# Patient Record
Sex: FEMALE | Race: WHITE | URBAN - METROPOLITAN AREA
[De-identification: names, ages, dates, MRNs, and addresses within clinical notes are randomized per-mention and may not be internally consistent; named-entity substitution may affect disease eponyms.]

---

## 2022-11-21 ENCOUNTER — LAB REQUISITION (OUTPATIENT)
Dept: LAB | Facility: CLINIC | Age: 37
End: 2022-11-21

## 2022-11-21 DIAGNOSIS — Z01.419 ENCOUNTER FOR GYNECOLOGICAL EXAMINATION (GENERAL) (ROUTINE) WITHOUT ABNORMAL FINDINGS: ICD-10-CM

## 2022-11-21 PROCEDURE — G0145 SCR C/V CYTO,THINLAYER,RESCR: HCPCS | Performed by: PHYSICIAN ASSISTANT

## 2022-11-21 PROCEDURE — 87624 HPV HI-RISK TYP POOLED RSLT: CPT | Performed by: PHYSICIAN ASSISTANT

## 2022-11-25 LAB
BKR LAB AP GYN ADEQUACY: NORMAL
BKR LAB AP GYN INTERPRETATION: NORMAL
BKR LAB AP HPV REFLEX: NORMAL
BKR LAB AP LMP: NORMAL
BKR LAB AP PREVIOUS ABNL DX: NORMAL
BKR LAB AP PREVIOUS ABNORMAL: NORMAL
PATH REPORT.COMMENTS IMP SPEC: NORMAL
PATH REPORT.COMMENTS IMP SPEC: NORMAL
PATH REPORT.RELEVANT HX SPEC: NORMAL

## 2022-11-28 LAB
HUMAN PAPILLOMA VIRUS 16 DNA: NEGATIVE
HUMAN PAPILLOMA VIRUS 18 DNA: NEGATIVE
HUMAN PAPILLOMA VIRUS FINAL DIAGNOSIS: ABNORMAL
HUMAN PAPILLOMA VIRUS OTHER HR: POSITIVE

## 2023-02-20 ENCOUNTER — LAB REQUISITION (OUTPATIENT)
Dept: LAB | Facility: CLINIC | Age: 38
End: 2023-02-20

## 2023-02-20 DIAGNOSIS — Z83.49 FAMILY HISTORY OF OTHER ENDOCRINE, NUTRITIONAL AND METABOLIC DISEASES: ICD-10-CM

## 2023-02-20 LAB — TSH SERPL DL<=0.005 MIU/L-ACNC: 0.81 UIU/ML (ref 0.3–4.2)

## 2023-02-20 PROCEDURE — 84443 ASSAY THYROID STIM HORMONE: CPT | Performed by: PHYSICIAN ASSISTANT

## 2023-10-16 ENCOUNTER — LAB REQUISITION (OUTPATIENT)
Dept: LAB | Facility: CLINIC | Age: 38
End: 2023-10-16

## 2023-10-16 DIAGNOSIS — Z01.419 ENCOUNTER FOR GYNECOLOGICAL EXAMINATION (GENERAL) (ROUTINE) WITHOUT ABNORMAL FINDINGS: ICD-10-CM

## 2023-10-16 PROCEDURE — 87624 HPV HI-RISK TYP POOLED RSLT: CPT | Performed by: PHYSICIAN ASSISTANT

## 2023-10-16 PROCEDURE — G0145 SCR C/V CYTO,THINLAYER,RESCR: HCPCS | Performed by: PHYSICIAN ASSISTANT

## 2023-10-20 LAB
HUMAN PAPILLOMA VIRUS 16 DNA: NEGATIVE
HUMAN PAPILLOMA VIRUS 18 DNA: NEGATIVE
HUMAN PAPILLOMA VIRUS FINAL DIAGNOSIS: NORMAL
HUMAN PAPILLOMA VIRUS OTHER HR: NEGATIVE

## 2024-12-02 ENCOUNTER — LAB REQUISITION (OUTPATIENT)
Dept: LAB | Facility: CLINIC | Age: 39
End: 2024-12-02

## 2024-12-02 DIAGNOSIS — Z12.4 ENCOUNTER FOR SCREENING FOR MALIGNANT NEOPLASM OF CERVIX: ICD-10-CM

## 2024-12-03 LAB
HPV HR 12 DNA CVX QL NAA+PROBE: NEGATIVE
HPV16 DNA CVX QL NAA+PROBE: NEGATIVE
HPV18 DNA CVX QL NAA+PROBE: NEGATIVE
HUMAN PAPILLOMA VIRUS FINAL DIAGNOSIS: NORMAL

## 2024-12-05 LAB
BKR AP ASSOCIATED HPV REPORT: NORMAL
BKR LAB AP GYN ADEQUACY: NORMAL
BKR LAB AP GYN INTERPRETATION: NORMAL
BKR LAB AP LMP: NORMAL
BKR LAB AP PREVIOUS ABNL DX: NORMAL
BKR LAB AP PREVIOUS ABNORMAL: NORMAL
PATH REPORT.COMMENTS IMP SPEC: NORMAL
PATH REPORT.COMMENTS IMP SPEC: NORMAL
PATH REPORT.RELEVANT HX SPEC: NORMAL

## 2025-02-24 NOTE — PROGRESS NOTES
Chief Complaint: Dyslipidemia    HPI (2/26/25): Mylene Vera is a 39 year old female with a past medical history of dyslipidemia who was referred to me for evaluation of dyslipidemia.     Briefly, Ms. Vera was recently seen by her OB/GYN.  The OB/GYN team ordered labs, including a lipid panel.  Dyslipidemia showed that she had total cholesterol 500, triglycerides of 238, LDL of 399, and HDL of 54.  Hence, she presents to cardiology for further evaluation.  Notably, she did have a lipid panel done in 2015.  This showed a total cholesterol 4 9, LDL of 276, HDL 47, triglycerides of 367. She had her thyroid function checked in October 2024 and it was normal.     Today, Ms. Vera notes that she was first told about her dyslipidemia in her adulthood. In the last few months, she has made a number of dietary changes to adopt a cardiovascularly healthy diet. Her cousin is a Dietitian. Her diet is rich in fruit and vegetables and she avoids red meat. She is a Teacher. Ms. Vera walks her dog twice a day for exercise and more on the weekends. No regular alcohol, tobacco, vaping, or illicits. She has been on atorvastatin 80 mg daily since December 2024.  Ms. Vera is asymptomatic.    A comprehensive ROS was done and the details are included above in the HPI.    Past Medical History:  - No prior history of hypertension, T2DM, dyslipidemia, CAD, TIA/stroke, vascular aneurysms, PAD  History reviewed. No pertinent past medical history.    Past Surgical History:    History reviewed. No pertinent surgical history.    Drug History:  Home cardiac meds: Atorvastatin 80 mg daily.  Current Outpatient Medications   Medication Sig Dispense Refill    atorvastatin (LIPITOR) 80 MG tablet Take 1 tablet (80 mg) by mouth at bedtime.*      norethindrone (MICRONOR) 0.35 MG tablet            Family History:  Father with prior MI before 55 (also with dyslipidemia)   Youngest brother with dyslipidemia and DM (another brother and sister  "without dyslipidemia)  Patient does not have kids  Cousins on paternal side also with hypercholesterolemia  - No family history of sudden cardiac death, valvular disorders  Family History   Problem Relation Age of Onset    Diabetes Mother     Heart Disease Father     Diabetes Father        Social History:    Social History     Tobacco Use    Smoking status: Never    Smokeless tobacco: Never   Substance Use Topics    Alcohol use: Yes     Comment: occ.       Allergies   Allergen Reactions    Sulfa Antibiotics Other (See Comments) and Unknown     PN: patient request   Mother is allergic to sulfa         Physical Examination:  Vitals: /84 (BP Location: Right arm, Patient Position: Chair, Cuff Size: Adult Large)   Pulse 86   Ht 1.575 m (5' 2\")   Wt 83.5 kg (184 lb)   SpO2 99%   BMI 33.65 kg/m    BMI= Body mass index is 33.65 kg/m .    GENERAL: Healthy, alert and no distress  RESPIRATORY: No signs of resp distress. Lungs CTAB.  CARDIOVASCULAR:  No JVD, regular, normal S1+S2 without added sounds or murmurs.  EXTREMITIES: Warm, well-perfused, no edema.   NEUROLOGY: GCS 15/15, no focal deficits.  VASC: No carotid bruits. Carotid, radial, brachial, popliteal, dorsalis pedis and posterior tibialis pulses are normal in volumes and symmetric bilaterally.   PSYCH: Cooperative, pleasant affect.       Investigations:  I personally viewed and interpreted the following investigations:    Labs:    CBC RESULTS:  No results found for: \"WBC\", \"RBC\", \"HGB\", \"HCT\", \"MCV\", \"MCH\", \"MCHC\", \"RDW\", \"PLT\"    CMP RESULTS:  No results found for: \"NA\", \"POTASSIUM\", \"CHLORIDE\", \"CO2\", \"ANIONGAP\", \"GLC\", \"BUN\", \"CR\", \"GFRESTIMATED\", \"GFRESTBLACK\", \"CHANTELL\", \"BILITOTAL\", \"ALBUMIN\", \"ALKPHOS\", \"ALT\", \"AST\"     INR RESULTS:  No results found for: \"INR\"    BNP RESULTS:  No results found for: \"NTBNPI\"  No results found for: \"BNP\"    LIPIDS:  No results found for: \"CHOL\"  No results found for: \"HDL\"  No results found for: \"LDL\"  No results found " "for: \"TRIG\"          Assessment and Plan:   Mylene Vera is a 39 year old female with a past medical history of dyslipidemia presented to me for evaluation of the same in ferry 2025.    Ms. Vera and I had a lengthy conversation greater than nature of dyslipidemia.  I have a suspicion that she has a genetic cause of her dyslipidemia given the significantly elevated LDL she exhibits in the setting of a good lifestyle.  In view of this, I recommended that she continue her atorvastatin 80 mg daily.  I also suggested that she seek genetic counseling and possible genetic testing.  Ms. Vera was also interested in further risk stratification given her family history of premature coronary disease.  In view of this, I have referred her for a CT CAC.    Problems:  Dyslipidemia  Family history of premature coronary disease    Plan:  -Continue atorvastatin 80 mg daily  -CT CAC  -Genetic counseling/testing      A total of 45 minutes were spent on the day of the visit for chart review, care coordination, face-to-face consultation with the patient, and documentation.       Vini Patel [facility] [place]    Cardiovascular Division    CC  Patient Care Team:  Catrachita Abbott MD as PCP - General (Family Medicine)                                                                     "

## 2025-02-26 ENCOUNTER — OFFICE VISIT (OUTPATIENT)
Dept: CARDIOLOGY | Facility: CLINIC | Age: 40
End: 2025-02-26
Payer: COMMERCIAL

## 2025-02-26 VITALS
HEART RATE: 86 BPM | HEIGHT: 62 IN | OXYGEN SATURATION: 99 % | DIASTOLIC BLOOD PRESSURE: 84 MMHG | SYSTOLIC BLOOD PRESSURE: 126 MMHG | WEIGHT: 184 LBS | BODY MASS INDEX: 33.86 KG/M2

## 2025-02-26 DIAGNOSIS — E78.5 DYSLIPIDEMIA: Primary | ICD-10-CM

## 2025-02-26 RX ORDER — ACETAMINOPHEN AND CODEINE PHOSPHATE 120; 12 MG/5ML; MG/5ML
SOLUTION ORAL
COMMUNITY

## 2025-02-26 RX ORDER — ATORVASTATIN CALCIUM 80 MG/1
TABLET, FILM COATED ORAL
COMMUNITY
Start: 2024-12-12

## 2025-02-26 NOTE — NURSING NOTE
"Chief Complaint   Patient presents with    New Patient     Lipid management        Initial /84 (BP Location: Right arm, Patient Position: Chair, Cuff Size: Adult Large)   Pulse 86   Ht 1.575 m (5' 2\")   Wt 83.5 kg (184 lb)   SpO2 99%   BMI 33.65 kg/m   Estimated body mass index is 33.65 kg/m  as calculated from the following:    Height as of this encounter: 1.575 m (5' 2\").    Weight as of this encounter: 83.5 kg (184 lb)..  BP completed using cuff size: alex POOLE  "

## 2025-02-26 NOTE — PATIENT INSTRUCTIONS
Take your medicines every day, as directed     Changes made today:  CT coronary artery calcium scan  Genetic Counselor referral  Follow up cholesterol labs (please message me if you need an order; otherwise please let me know when they have been done)       Cardiology Care Coordinators:      Anna HIGUERA RN       Cardiology Rooming Staff:  Alayna AYALA     Phone  [phone]      Fax [phone]    To Contact us     During Business Hours:  [phone]     If you are needing refills please contact your pharmacy.     For urgent after hour care please call the [facility] at [phone] or the [facility] at [phone] and ask to speak to the cardiologist on call.    If you are having a medical emergency, please call 911.            HOW TO CHECK YOUR BLOOD PRESSURE AT HOME:     Avoid eating, smoking, and exercising for at least 30 minutes before taking a reading.     Be sure you have taken your BP medication at least 2-3 hours before you check it.      Sit quietly for 10 minutes before a reading.      Sit in a chair with your feet flat on the floor. Rest your  arm on a table so that the arm cuff is at the same level as your heart.     Remain still during the reading.  Record your blood pressure and pulse in a log and bring to your next appointment.       Use [employer] allows you to communicate directly with your heart team through secure messaging.  [employer] can be accessed any time on your phone, computer, or tablet.  If you need assistance, we'd be happy to help!             Keep your Heart Appointments:     Follow-up with me after tests

## 2025-03-06 ENCOUNTER — LAB (OUTPATIENT)
Dept: LAB | Facility: CLINIC | Age: 40
End: 2025-03-06
Payer: COMMERCIAL

## 2025-03-06 DIAGNOSIS — E78.5 DYSLIPIDEMIA: ICD-10-CM

## 2025-03-06 LAB
CHOLEST SERPL-MCNC: 196 MG/DL
FASTING STATUS PATIENT QL REPORTED: YES
HDLC SERPL-MCNC: 38 MG/DL
LDLC SERPL CALC-MCNC: 141 MG/DL
NONHDLC SERPL-MCNC: 158 MG/DL
TRIGL SERPL-MCNC: 85 MG/DL

## 2025-03-09 ENCOUNTER — HEALTH MAINTENANCE LETTER (OUTPATIENT)
Age: 40
End: 2025-03-09

## 2025-03-18 NOTE — PROGRESS NOTES
Virtual Visit Details  Type of service:  Video Visit   Video Start Time:  12:30 PM  Video End Time: 1:03 PM    Originating Location (pt. Location): Home  Distant Location (provider location):  Off-site  Platform used for Video Visit: Hope    Here is a copy of the progress note from your recent genetic counseling visit to [facility] on Date: 3/19/2025.    PROGRESS NOTE: Mylene was referred by Vini Patel MD for genetic counseling due to her history of high cholesterol and a family history of familial hypercholesterolemia and a known mutation in the LDLR gene.  I had the opportunity to talk with Mylene today to discuss the genetic component of FH and testing options available to her.     MEDICAL HISTORY: Mylene is a 39 year-old female with dyslipidemia. She first had her lipids checked in  which showed her total cholesterol 409, LDL of 276, HDL 47, triglycerides of 367. She was not started on any medication at this time. She more recently had her lipids checked which showed her total cholesterol 500, triglycerides of 238, LDL of 399, and HDL of 54. She started on 80 mg of atorvastatin in 2024.    FAMILY HISTORY: A detailed family history was obtained today and was significant for the following cardiac history:    Paternal history  Father is 66 and has very elevated cholesterol levels (reportedly around 1000). He had an MI at 58 and is on Repatha. He also has diabetes.  Aunt is 68 and has high cholesterol and is on a statin. Her daughter (Mylene's cousin) is 36 and has high cholesterol and underwent genetic testing for FH, which identified a mutation in the LDLR gene (c. 131G>A; p.W44*). This also identified a variant of uncertain significance in the LDLR gene (c.1486G>A; p.G496S).   Grandfather  at 57 from an MI. His first MI was at 37. His brother (Mylene's great-uncle)  at 40 from an MI.  Maternal history  Mother has diabetes and  hypothyroidism. Her cholesterol levels are normal.  Siblings  Brother is 36 and has high cholesterol (levels unknown) and is on a statin. He also has diabetes.  Brother and sister who reportedly have normal cholesterol levels.  No children    There is no additional history of cardiomyopathy, arrhythmias, heart attacks, fainting, sudden cardiac death, genetic conditions, or birth defects. (Scanned pedigree may be under media tab)    DISCUSSION:  High cholesterol can be caused by both genetic and non-genetic factors.  There are lots of well known diet and lifestyle choices that are known to increase risk for high cholesterol.  However, in some families, there is an underlying genetic predisposition for high cholesterol. The genetic component can be either causative, meaning that it causes disease, or contributory, meaning that it increases risk but does not cause it out right.     One example of a causative gene is familial hypercholesterolemia (FH), a genetic condition characterized by abnormally high concentrations of low density lipoprotein cholesterol (LDL-C) in the blood since birth.  FH predisposes affected individuals to premature coronary heart disease (CHD), stroke, and death. Individuals with FH have a significantly increased risk for CHD, estimated to be 20 times greater than that of the general population. If untreated, men with FH have a 50% risk and women with FH have a 30% risk of having a cardiovascular event by ages 50 and 60 years, respectively. The prevalence of FH is estimated to be around 1:250 individuals in the general population, with an even higher prevalence in certain ethnic groups.     Heterozygous FH (HeFH) is inherited in an autosomal dominant manner, meaning that only one mutation is needed to cause disease. Almost all affected individuals inherit the familial mutation from an affected parent. In addition, all children of an individual with HeFH have a 50% chance of inheriting the  mutation. Homozygous FH (HoFH) is rare (prevalence 1:1,000,000) and occurs when an individual inherits two mutations. Therefore, if both parents have HeFH, each of their children would have a 25% risk of inheriting HoFH and a 50% risk of inheriting HeFH.    A diagnosis of FH is typically made on a clinical basis. There are several sets of validated diagnostic criteria available, including the [place] MEDPED Program, the [place] FH Registry, and the [place] [facility]. Genetic testing is also available to confirm the diagnosis.     Currently 4 genes have been found to be related to FH.  Genetic testing currently identifies mutations in greater than 90% of definitive FH cases and 30% of probable FH cases.     A mutation in the LDLR gene was previously identified in Mylene's paternal cousin. Discussed that this is most likely the cause of Mylene's high cholesterol. 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 [employer] offers free testing for the mutation found in her cousin for 90 days, so Mylene will not be billed for the cost of this test. If Mylene were to test negative for this mutation, we can pursue a comprehensive FH panel to look for a different mutation.     Test results take approximately 2-4 weeks on average.     Discussed pros and cons of genetic testing. Explained that results could determine the cause for FH. If a mutation is identified, presymptomatic testing is available to all at risk relatives. Reviewed possible issues associated with presymptomatic testing including genetic discrimination, current laws to prevent discrimination (ie. MACIE), insurance issues, and emotional and psychosocial outcomes of testing.     Due to the familial risk, we also recommend that at risk family members undergo lipid or molecular analysis of cholesterol levels.     All questions  answered at this time.      PLAN: Mylene elected to proceed with genetic testing.  Requisition was completed and verbal consent was obtained, due to virtual visit.  A saliva sample kit will be sent directly to the patient.  Once sample is collected, kit will be mailed to [facility].  I will contact patient when results are available.     TOTAL TIME SPENT: I spent 47 minutes on the day of the encounter doing chart review, collecting medical and family history, counseling, documentation and further activities as noted above.    Marnie Hathaway MS, [facility] [place], [place]  Licensed, Certified Genetic Counselor  [facility]  [facility]

## 2025-03-19 ENCOUNTER — VIRTUAL VISIT (OUTPATIENT)
Dept: CARDIOLOGY | Facility: CLINIC | Age: 40
End: 2025-03-19
Payer: COMMERCIAL

## 2025-03-19 VITALS — HEIGHT: 62 IN | BODY MASS INDEX: 33.13 KG/M2 | WEIGHT: 180 LBS

## 2025-03-19 DIAGNOSIS — E78.01 FAMILIAL HYPERCHOLESTEROLEMIA: Primary | ICD-10-CM

## 2025-03-19 DIAGNOSIS — E78.5 DYSLIPIDEMIA: ICD-10-CM

## 2025-03-19 PROCEDURE — 96041 GENETIC COUNSELING SVC EA 30: CPT | Mod: GT,95

## 2025-03-19 ASSESSMENT — PAIN SCALES - GENERAL: PAINLEVEL_OUTOF10: NO PAIN (0)

## 2025-03-19 NOTE — NURSING NOTE
Current patient location: [address] [place] [address]    Is the patient currently in the state of [place]? YES    Visit mode: VIDEO    If the visit is dropped, the patient can be reconnected by:VIDEO VISIT: Send to e-mail at: [email or web address]    Will anyone else be joining the visit? NO  (If patient encounters technical issues they should call [phone])    Are changes needed to the allergy or medication list? No    Are refills needed on medications prescribed by this physician? NO    Rooming Documentation:  Not applicable    Reason for visit: Consult    No other vitals to report per pt    Millie MOSESF

## 2025-03-19 NOTE — LETTER
3/19/2025      RE: Mylene Vera  [address] [place] [address]       Dear Colleague,    Thank you for the opportunity to participate in the care of your patient, Mylene Vera, at the [facility] [place] at [facility]. Please see a copy of my visit note below.    Virtual Visit Details  Type of service:  Video Visit   Video Start Time:  12:30 PM  Video End Time: 1:03 PM    Originating Location (pt. Location): Home  Distant Location (provider location):  Off-site  Platform used for Video Visit: Hope    Here is a copy of the progress note from your recent genetic counseling visit to the [facility] on Date: 3/19/2025.    PROGRESS NOTE: Mylene was referred by Vini Patel MD for genetic counseling due to her history of high cholesterol and a family history of familial hypercholesterolemia and a known mutation in the LDLR gene.  I had the opportunity to talk with Mylene today to discuss the genetic component of FH and testing options available to her.     MEDICAL HISTORY: Mylene is a 39 year-old female with dyslipidemia. She first had her lipids checked in 2015 which showed her total cholesterol 409, LDL of 276, HDL 47, triglycerides of 367. She was not started on any medication at this time. She more recently had her lipids checked which showed her total cholesterol 500, triglycerides of 238, LDL of 399, and HDL of 54. She started on 80 mg of atorvastatin in December 2024.    FAMILY HISTORY: A detailed family history was obtained today and was significant for the following cardiac history:    Paternal history  Father is 66 and has very elevated cholesterol levels (reportedly around 1000). He had an MI at 58 and is on Repatha. He also has diabetes.  Aunt is 68 and has high cholesterol and is on a statin. Her daughter (Mylene's cousin) is 36 and has high cholesterol and  underwent genetic testing for FH, which identified a mutation in the LDLR gene (c. 131G>A; p.W44*). This also identified a variant of uncertain significance in the LDLR gene (c.1486G>A; p.G496S).   Grandfather  at 57 from an MI. His first MI was at 37. His brother (Mylene's great-uncle)  at 40 from an MI.  Maternal history  Mother has diabetes and hypothyroidism. Her cholesterol levels are normal.  Siblings  Brother is 36 and has high cholesterol (levels unknown) and is on a statin. He also has diabetes.  Brother and sister who reportedly have normal cholesterol levels.  No children    There is no additional history of cardiomyopathy, arrhythmias, heart attacks, fainting, sudden cardiac death, genetic conditions, or birth defects. (Scanned pedigree may be under media tab)    DISCUSSION:  High cholesterol can be caused by both genetic and non-genetic factors.  There are lots of well known diet and lifestyle choices that are known to increase risk for high cholesterol.  However, in some families, there is an underlying genetic predisposition for high cholesterol. The genetic component can be either causative, meaning that it causes disease, or contributory, meaning that it increases risk but does not cause it out right.     One example of a causative gene is familial hypercholesterolemia (FH), a genetic condition characterized by abnormally high concentrations of low density lipoprotein cholesterol (LDL-C) in the blood since birth.  FH predisposes affected individuals to premature coronary heart disease (CHD), stroke, and death. Individuals with FH have a significantly increased risk for CHD, estimated to be 20 times greater than that of the general population. If untreated, men with FH have a 50% risk and women with FH have a 30% risk of having a cardiovascular event by ages 50 and 60 years, respectively. The prevalence of FH is estimated to be around 1:250 individuals in the general population, with an  even higher prevalence in certain ethnic groups.     Heterozygous FH (HeFH) is inherited in an autosomal dominant manner, meaning that only one mutation is needed to cause disease. Almost all affected individuals inherit the familial mutation from an affected parent. In addition, all children of an individual with HeFH have a 50% chance of inheriting the mutation. Homozygous FH (HoFH) is rare (prevalence 1:1,000,000) and occurs when an individual inherits two mutations. Therefore, if both parents have HeFH, each of their children would have a 25% risk of inheriting HoFH and a 50% risk of inheriting HeFH.    A diagnosis of FH is typically made on a clinical basis. There are several sets of validated diagnostic criteria available, including the [place] MEDPED Program, the [place] FH Registry, and the [place] [facility]. Genetic testing is also available to confirm the diagnosis.     Currently 4 genes have been found to be related to FH.  Genetic testing currently identifies mutations in greater than 90% of definitive FH cases and 30% of probable FH cases.     A mutation in the LDLR gene was previously identified in Mylene's paternal cousin. Discussed that this is most likely the cause of Mylene's high cholesterol. 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 [employer] offers free testing for the mutation found in her cousin for 90 days, so Mylene will not be billed for the cost of this test. If Mylene were to test negative for this mutation, we can pursue a comprehensive FH panel to look for a different mutation.     Test results take approximately 2-4 weeks on average.     Discussed pros and cons of genetic testing. Explained that results could determine the cause for FH. If a mutation is identified, presymptomatic testing is available to all at risk relatives. Reviewed possible  issues associated with presymptomatic testing including genetic discrimination, current laws to prevent discrimination (ie. MACIE), insurance issues, and emotional and psychosocial outcomes of testing.     Due to the familial risk, we also recommend that at risk family members undergo lipid or molecular analysis of cholesterol levels.     All questions answered at this time.      PLAN: Mylene elected to proceed with genetic testing.  Requisition was completed and verbal consent was obtained, due to virtual visit.  A saliva sample kit will be sent directly to the patient.  Once sample is collected, kit will be mailed to [facility] laboratory.  I will contact patient when results are available.     TOTAL TIME SPENT: I spent 47 minutes on the day of the encounter doing chart review, collecting medical and family history, counseling, documentation and further activities as noted above.    Marnie Hathaway MS, [facility] [place]  Licensed, Certified Genetic Counselor  [facility]  [facility]        Please do not hesitate to contact me if you have any questions/concerns.     Sincerely,     Marnie Hathaway GC

## 2025-03-20 NOTE — PATIENT INSTRUCTIONS
Indication for Genetic Counseling:     High cholesterol can be caused by both genetic and non-genetic factors.  There are lots of well known diet and lifestyle choices that are known to increase risk for high cholesterol.  However, in some families, there is an underlying genetic predisposition for high cholesterol. The genetic component can be either causative, meaning that it causes disease, or contributory, meaning that it increases risk but does not cause it out right.     One example of a causative gene is familial hypercholesterolemia (FH), a genetic condition characterized by abnormally high concentrations of low density lipoprotein cholesterol (LDL-C) in the blood since birth.  FH predisposes affected individuals to premature coronary heart disease (CHD), stroke, and death. Individuals with FH have a significantly increased risk for CHD, estimated to be 20 times greater than that of the general population. If untreated, men with FH have a 50% risk and women with FH have a 30% risk of having a cardiovascular event by ages 50 and 60 years, respectively. The prevalence of FH is estimated to be around 1:250  individuals in the general population, with an even higher prevalence in certain ethnic groups.     Inheritance:   Humans have over 20,000 genes that instruct our bodies how to grow and function.  We have two copies of each gene because we inherit one from our mother and one from our father.  The condition in your family is inherited in an autosomal dominant (AD) pattern. Dominant means that only one copy of the gene needs to be broken (gene mutation or pathogenic variant).  Since most people with a dominant condition have one normal gene and one broken gene, the risk that other family members carry the same gene is increased.  Parents, siblings, and children have a 50% chance of inheriting the mutation.      Testing Options:   Genetic testing is performed to assess a panel of genes known to cause this  condition.  The test reads through the DNA of these genes (sequencing) to look for spelling mistakes or mutations that could cause the condition.      Genetic testing previously performed in your family member identified a gene mutation.    Therefore, instead of looking at all genes associated with this condition, this genetic test will look for the specific mutation(s) found in your family member(s).  If the familial mutation(s) is detected, you are at increased risk to develop the condition and should follow the recommended screening guidelines provided by a cardiologist.  If the mutation is not detected, you are not at increased risk to develop the condition based on your genetic results.      Although we predict everyone who carries a mutation is at increased risk for developing the condition, we cannot predict age of onset or severity of symptoms due to reduced penetrance and variable expressivity.    Logistics:   Genetic testing involves collecting a sample of DNA, thru blood, saliva, or cheek cells.  The sample will be sent to a laboratory to extract the DNA and sequence the genes for mutations.  The lab offers free testing for family members for 90 days after a positive result, so you should not be billed for this test.  When testing is initiated, results take about 2-4 weeks to return. I will contact you over the phone when results are available.     Genetic Information and Nondiscrimination Act:  The Genetic Information and Nondiscrimination Act of 2008 (MACIE) is a federal law that protects individuals from genetic discrimination in health insurance and employment. Genetic discrimination is defined as the misuse of genetic information. This law does not address potential discrimination regarding life insurance or disability insurance.      This is especially relevant for at risk individuals who are considering presymptomatic testing.    Screening Recommendations:  Recommend that first degree relatives,  including parents, siblings and children, be screened regularly for cholesterol levels starting in childhood.    Resources:  The FH Foundation - familyheart.org    General   American Heart Association - americanheart.org  Genetics Home Reference - ghr.nlm.nih.gov  Genetic Information and Nondiscrimination Act - ginahelp.org    Contact Information:  Marnie Hathaway MS, JD [facility]  Licensed, Certified Genetic Counselor  [facility]  [facility]     Office:  [phone]  Appointments:  [phone]  Fax: [phone]  Email: [email or web address]

## 2025-04-22 ENCOUNTER — TELEPHONE (OUTPATIENT)
Dept: CARDIOLOGY | Facility: CLINIC | Age: 40
End: 2025-04-22
Payer: COMMERCIAL

## 2025-04-22 NOTE — TELEPHONE ENCOUNTER
Called M[facility] to review genetic testing results, no answer. LVM and invited call back,    Marnie Hathaway MS, [facility]  Licensed, Certified Genetic Counselor  [facility]  [facility]

## 2025-06-19 ENCOUNTER — MYC MEDICAL ADVICE (OUTPATIENT)
Dept: CARDIOLOGY | Facility: CLINIC | Age: 40
End: 2025-06-19
Payer: COMMERCIAL

## 2025-06-26 NOTE — TELEPHONE ENCOUNTER
Thanks Olivia. Can you please let me know when the Zio results are back?   I would not recommend any other tests other what Dr. Abbott recommended. II assume you already gave her some general advice for vasovagal syncope -- adequate hydration, compression stockings, slowing rising. If not, can you please call her to discuss this. Thanks.     Vini     Attempted to call patient. Left message for patient.       Olivia Farfan, RN, BSN  Cardiology RN Care Coordinator   [place]   Phone: [phone]  Fax: [phone] ([place]) [phone] ([place])

## 2025-06-27 NOTE — TELEPHONE ENCOUNTER
Attempted to call patient. Left message for patient.    Olivia Farfan, RN, BSN  Cardiology RN Care Coordinator   [place]   Phone: [phone]  Fax: [phone] ([place]) [phone] ([place])

## 2025-07-01 NOTE — TELEPHONE ENCOUNTER
Patient saw [employer] message.    Olivia Farfan, RN, BSN  Cardiology RN Care Coordinator   [place]   Phone: [phone]  Fax: [phone] ([place]) [phone] ([place])

## 2025-08-08 ENCOUNTER — MYC MEDICAL ADVICE (OUTPATIENT)
Dept: CARDIOLOGY | Facility: CLINIC | Age: 40
End: 2025-08-08
Payer: COMMERCIAL